# Patient Record
Sex: MALE | URBAN - METROPOLITAN AREA
[De-identification: names, ages, dates, MRNs, and addresses within clinical notes are randomized per-mention and may not be internally consistent; named-entity substitution may affect disease eponyms.]

---

## 2021-10-25 ENCOUNTER — OFFICE VISIT (OUTPATIENT)
Dept: URBAN - METROPOLITAN AREA CLINIC 92 | Facility: CLINIC | Age: 66
End: 2021-10-25
Payer: SELF-PAY

## 2021-10-25 ENCOUNTER — DASHBOARD ENCOUNTERS (OUTPATIENT)
Age: 66
End: 2021-10-25

## 2021-10-25 DIAGNOSIS — Z86.010 HISTORY OF COLON POLYPS: ICD-10-CM

## 2021-10-25 DIAGNOSIS — R10.9 ABDOMINAL CRAMPING: ICD-10-CM

## 2021-10-25 DIAGNOSIS — K57.30 DIVERTICULA OF COLON: ICD-10-CM

## 2021-10-25 DIAGNOSIS — R19.7 DIARRHEA, UNSPECIFIED TYPE: ICD-10-CM

## 2021-10-25 PROBLEM — 428283002: Status: ACTIVE | Noted: 2021-10-25

## 2021-10-25 PROBLEM — 733657002: Status: ACTIVE | Noted: 2021-10-25

## 2021-10-25 PROCEDURE — 99443 PHONE E/M BY PHYS 21-30 MIN: CPT | Performed by: INTERNAL MEDICINE

## 2021-10-25 RX ORDER — HYOSCYAMINE SULFATE 0.12 MG/1
1 TABLET UNDER THE TONGUE AND ALLOW TO DISSOLVE  AS NEEDED TABLET SUBLINGUAL THREE TIMES A DAY
Qty: 30 | Refills: 3 | OUTPATIENT
Start: 2021-10-25 | End: 2022-02-21

## 2021-10-25 NOTE — HPI-TODAY'S VISIT:
This is a 66-year-old male who presents today for evaluation of abdominal issues.  He notes that for the last several months he has had crampy-like abdominal pain associated with diarrhea.  He will have 2 bowel movements per day.  He does have occasional nocturnal awakening for defecation.  There is no blood in stool.  There is no fever or chills.  Of note he did have an episode of complicated diverticulitis last year.  He did require resection.  His last colonoscopy was 2 years ago.  He does note that he had a couple polyps removed.  I do not have records of his surgery or colonoscopy for review at this time.